# Patient Record
Sex: FEMALE | Race: WHITE
[De-identification: names, ages, dates, MRNs, and addresses within clinical notes are randomized per-mention and may not be internally consistent; named-entity substitution may affect disease eponyms.]

---

## 2018-10-16 ENCOUNTER — HOSPITAL ENCOUNTER (OUTPATIENT)
Dept: HOSPITAL 88 - ER | Age: 68
Setting detail: OBSERVATION
LOS: 1 days | End: 2018-10-17
Attending: INTERNAL MEDICINE | Admitting: INTERNAL MEDICINE
Payer: MEDICARE

## 2018-10-16 VITALS — HEIGHT: 63 IN | BODY MASS INDEX: 28.35 KG/M2 | WEIGHT: 160 LBS

## 2018-10-16 DIAGNOSIS — K58.9: ICD-10-CM

## 2018-10-16 DIAGNOSIS — Z51.5: ICD-10-CM

## 2018-10-16 DIAGNOSIS — Z66: ICD-10-CM

## 2018-10-16 DIAGNOSIS — I95.9: ICD-10-CM

## 2018-10-16 DIAGNOSIS — K52.9: ICD-10-CM

## 2018-10-16 DIAGNOSIS — Z74.01: ICD-10-CM

## 2018-10-16 DIAGNOSIS — R41.82: Primary | ICD-10-CM

## 2018-10-16 DIAGNOSIS — I10: ICD-10-CM

## 2018-10-16 DIAGNOSIS — F41.9: ICD-10-CM

## 2018-10-16 DIAGNOSIS — R50.9: ICD-10-CM

## 2018-10-16 DIAGNOSIS — J44.9: ICD-10-CM

## 2018-10-16 DIAGNOSIS — F32.9: ICD-10-CM

## 2018-10-16 DIAGNOSIS — K21.9: ICD-10-CM

## 2018-10-16 PROCEDURE — 99284 EMERGENCY DEPT VISIT MOD MDM: CPT

## 2018-12-09 NOTE — DISCHARGE SUMMARY
SHORT STAY AND DEATH SUMMARY



DATE OF DEATH:  2018



ADMITTING DIAGNOSES

1. Altered mental status, patient found unresponsive.

2. Hypertension.

3. Dementia.

4. Chronic obstructive pulmonary disease.



CAUSE OF DEATH:  Sudden cardiac arrest.



BRIEF HISTORY:  Ms. Schmid is a 68-year-old lady who was brought in from 

the nursing home due to being unresponsive at the nursing home.  She has a 

history of hypertension, COPD, and dementia.  The patient was admitted to 

the floor for observation, on some antibiotics.  The family had made her 

DNR and asked for comfort care only.  The patient deteriorated on the floor 

and  and was pronounced by the ER physician before being seen by the 

admitting service.





 _________________________________

AJ SHUKLA MD



DD:  2018 14:51

DT:  2018 15:15

Job#:  [id number]